# Patient Record
Sex: MALE | Race: WHITE
[De-identification: names, ages, dates, MRNs, and addresses within clinical notes are randomized per-mention and may not be internally consistent; named-entity substitution may affect disease eponyms.]

---

## 2018-03-24 ENCOUNTER — HOSPITAL ENCOUNTER (EMERGENCY)
Dept: HOSPITAL 45 - C.EDB | Age: 73
Discharge: HOME | End: 2018-03-24
Payer: COMMERCIAL

## 2018-03-24 VITALS
WEIGHT: 238.1 LBS | HEIGHT: 70.98 IN | HEIGHT: 70.98 IN | WEIGHT: 238.1 LBS | BODY MASS INDEX: 33.33 KG/M2 | BODY MASS INDEX: 33.33 KG/M2

## 2018-03-24 VITALS
HEART RATE: 83 BPM | OXYGEN SATURATION: 97 % | SYSTOLIC BLOOD PRESSURE: 146 MMHG | TEMPERATURE: 98.06 F | DIASTOLIC BLOOD PRESSURE: 92 MMHG

## 2018-03-24 DIAGNOSIS — I10: ICD-10-CM

## 2018-03-24 DIAGNOSIS — R30.0: Primary | ICD-10-CM

## 2018-03-24 DIAGNOSIS — Z79.84: ICD-10-CM

## 2018-03-24 DIAGNOSIS — Z83.3: ICD-10-CM

## 2018-03-24 DIAGNOSIS — Z82.49: ICD-10-CM

## 2018-03-24 DIAGNOSIS — Z79.899: ICD-10-CM

## 2018-03-24 DIAGNOSIS — M54.5: ICD-10-CM

## 2018-03-24 DIAGNOSIS — N40.0: ICD-10-CM

## 2018-03-24 LAB
BASOPHILS # BLD: 0.04 K/UL (ref 0–0.2)
BASOPHILS NFR BLD: 0.5 %
BUN SERPL-MCNC: 6 MG/DL (ref 7–18)
CALCIUM SERPL-MCNC: 8.8 MG/DL (ref 8.5–10.1)
CO2 SERPL-SCNC: 28 MMOL/L (ref 21–32)
CREAT SERPL-MCNC: 1.1 MG/DL (ref 0.6–1.4)
EOS ABS #: 0.07 K/UL (ref 0–0.5)
EOSINOPHIL NFR BLD AUTO: 177 K/UL (ref 130–400)
GLUCOSE SERPL-MCNC: 121 MG/DL (ref 70–99)
HCT VFR BLD CALC: 45.9 % (ref 42–52)
HGB BLD-MCNC: 16 G/DL (ref 14–18)
IG#: 0.01 K/UL (ref 0–0.02)
IMM GRANULOCYTES NFR BLD AUTO: 33.5 %
LYMPHOCYTES # BLD: 2.57 K/UL (ref 1.2–3.4)
MCH RBC QN AUTO: 33.1 PG (ref 25–34)
MCHC RBC AUTO-ENTMCNC: 34.9 G/DL (ref 32–36)
MCV RBC AUTO: 94.8 FL (ref 80–100)
MONO ABS #: 0.66 K/UL (ref 0.11–0.59)
MONOCYTES NFR BLD: 8.6 %
NEUT ABS #: 4.32 K/UL (ref 1.4–6.5)
NEUTROPHILS # BLD AUTO: 0.9 %
NEUTROPHILS NFR BLD AUTO: 56.4 %
PMV BLD AUTO: 10.2 FL (ref 7.4–10.4)
POTASSIUM SERPL-SCNC: 4.3 MMOL/L (ref 3.5–5.1)
RED CELL DISTRIBUTION WIDTH CV: 12.8 % (ref 11.5–14.5)
RED CELL DISTRIBUTION WIDTH SD: 44.3 FL (ref 36.4–46.3)
SODIUM SERPL-SCNC: 139 MMOL/L (ref 136–145)
WBC # BLD AUTO: 7.67 K/UL (ref 4.8–10.8)

## 2018-03-24 NOTE — EMERGENCY ROOM VISIT NOTE
History


Report prepared by Kayce:  Tad Hernandez


Under the Supervision of:  Dr. Kenyon Lawton D.O.


First contact with patient:  09:05


Chief Complaint:  URINARY SYMPTOMS


Stated Complaint:  SLOW URINE FLOW


Nursing Triage Summary:  


patient states he has BPH and takes flomax. "I feel like its not really 


working.. I feel like I am not emptying and its burning."





History of Present Illness


The patient is a 72 year old male who presents to the Emergency Room with 

complaints of urinary symptoms that began 1 days ago. The patient woke up this 

morning and had minimal urinary output with associated burning. He reports low 

back ache.He denies taking his blood pressure medication today. He states that 

he has BPH and takes Flomax. He denies abdominal pain.





   Source of History:  patient


   Onset:  1 day ago


   Position:  pelvis


   Quality:  burning


   Timing:  constant


   Associated Symptoms:  + back pain, + urinary symptoms (minimal output with 

burning), No abdominal pain





Review of Systems


See HPI for pertinent positives & negatives. A total of 10 systems reviewed and 

were otherwise negative.





Past Medical & Surgical


Medical Problems:


(1) BPH (benign prostatic hyperplasia)


(2) HTN (hypertension)








Family History





Diabetes mellitus


Hypertension





Social History


Smoking Status:  Never Smoker


Alcohol Use:  none


Drug Use:  none


Marital Status:  


Housing Status:  lives with family


Occupation Status:  employed, other





Current/Historical Medications


Scheduled


Atenolol (Atenolol), 25 MG PO QAM


Metformin Hcl (Glucophage), 500 MG PO BID


Rosuvastatin Calcium (Rosuvastatin Calcium), 5 MG PO DAILY


Tamsulosin HCl (Tamsulosin HCl), 0.4 MG PO DAILY





Allergies


Coded Allergies:  


     No Known Allergies (Unverified , 7/20/15)





Physical Exam


Vital Signs











  Date Time  Temp Pulse Resp B/P (MAP) Pulse Ox O2 Delivery O2 Flow Rate FiO2


 


3/24/18 11:56 36.7 83 18 146/92 97   


 


3/24/18 10:43  70 18 158/93 97 Room Air  


 


3/24/18 08:57 36.7 75 20 153/99 95 Room Air  











Physical Exam


CONSTITUTIONAL/VITAL SIGNS: Reviewed / noted above.


GENERAL: Non-toxic in appearance. 


INTEGUMENTARY: Warm, dry, and Pink.


HEAD: Normocephalic.


EYES: without scleral icterus or trauma.


ENT/OROPHARYNX: clear and moist.


LYMPHADENOPATHY/NECK: Is supple without lymphadenopathy or meningismus.


RESPIRATORY: Lungs clear and equal.


CARDIOVASCULAR: Regular rate and rhythm.


GI/ABDOMEN: Soft and nontender. No organomegaly or pulsatile mass. No rebound 

or guarding. Normal bowel sounds.


EXTREMITIES: Warm and well perfused.


BACK: No CVA tenderness.


NEUROLOGICAL: Intact without focal deficits. 


PSYCHIATRIC: normal affect.


MUSCULOSKELETAL: Normally developed with good muscle tone.





Medical Decision & Procedures


ER Provider


Diagnostic Interpretation:


Radiology results as stated below per my review and radiologist interpretation:





ABD/PELVIS WITHOUT FOR STONE





CT DOSE: 1959.11 mGy.cm





HISTORY:      flank pain





TECHNIQUE: Multiaxial CT images of the abdomen and pelvis were performed without


the use of intravenous and oral contrast according to the standard department


stone protocol.  A dose lowering technique was utilized adhering to the


principles of ALARA.





COMPARISON STUDY: None.





FINDINGS: Lung bases are clear. Liver spleen and pancreas are unremarkable.


Kidneys negative for calcification or hydronephrosis. Normal appendix.


Nonobstructive bowel pattern. Mild chronic sigmoid diverticulosis. Bladder is


midline. Small fat-containing inguinal hernias bilaterally. Slight thickening of


the root of the mesentery most likely age-related.





IMPRESSION:  





1. Mild chronic sigmoid diverticulosis.


2. No evidence for diverticulitis.


3. Abdomen and pelvis is otherwise negative. 


4. Small fat-containing inguinal hernias bilaterally.














The above report was generated using voice recognition software.  It may contain


grammatical, syntax or spelling errors.











Electronically signed by:  Kristian Sanchez M.D.


3/24/2018 11:14 AM





Dictated Date/Time:  3/24/2018 11:11 AM





Laboratory Results


3/24/18 09:15








Red Blood Count 4.84, Mean Corpuscular Volume 94.8, Mean Corpuscular Hemoglobin 

33.1, Mean Corpuscular Hemoglobin Concent 34.9, Mean Platelet Volume 10.2, 

Neutrophils (%) (Auto) 56.4, Lymphocytes (%) (Auto) 33.5, Monocytes (%) (Auto) 

8.6, Eosinophils (%) (Auto) 0.9, Basophils (%) (Auto) 0.5, Neutrophils # (Auto) 

4.32, Lymphocytes # (Auto) 2.57, Monocytes # (Auto) 0.66, Eosinophils # (Auto) 

0.07, Basophils # (Auto) 0.04





3/24/18 09:15

















Test


  3/24/18


09:15 3/24/18


09:20


 


White Blood Count


  7.67 K/uL


(4.8-10.8) 


 


 


Red Blood Count


  4.84 M/uL


(4.7-6.1) 


 


 


Hemoglobin


  16.0 g/dL


(14.0-18.0) 


 


 


Hematocrit 45.9 % (42-52)  


 


Mean Corpuscular Volume


  94.8 fL


() 


 


 


Mean Corpuscular Hemoglobin


  33.1 pg


(25-34) 


 


 


Mean Corpuscular Hemoglobin


Concent 34.9 g/dl


(32-36) 


 


 


Platelet Count


  177 K/uL


(130-400) 


 


 


Mean Platelet Volume


  10.2 fL


(7.4-10.4) 


 


 


Neutrophils (%) (Auto) 56.4 %  


 


Lymphocytes (%) (Auto) 33.5 %  


 


Monocytes (%) (Auto) 8.6 %  


 


Eosinophils (%) (Auto) 0.9 %  


 


Basophils (%) (Auto) 0.5 %  


 


Neutrophils # (Auto)


  4.32 K/uL


(1.4-6.5) 


 


 


Lymphocytes # (Auto)


  2.57 K/uL


(1.2-3.4) 


 


 


Monocytes # (Auto)


  0.66 K/uL


(0.11-0.59) 


 


 


Eosinophils # (Auto)


  0.07 K/uL


(0-0.5) 


 


 


Basophils # (Auto)


  0.04 K/uL


(0-0.2) 


 


 


RDW Standard Deviation


  44.3 fL


(36.4-46.3) 


 


 


RDW Coefficient of Variation


  12.8 %


(11.5-14.5) 


 


 


Immature Granulocyte % (Auto) 0.1 %  


 


Immature Granulocyte # (Auto)


  0.01 K/uL


(0.00-0.02) 


 


 


Anion Gap


  6.0 mmol/L


(3-11) 


 


 


Est Creatinine Clear Calc


Drug Dose 74.7 ml/min 


  


 


 


Estimated GFR (


American) 76.8 


  


 


 


Estimated GFR (Non-


American 66.2 


  


 


 


BUN/Creatinine Ratio 5.7 (10-20)  


 


Calcium Level


  8.8 mg/dl


(8.5-10.1) 


 


 


Urine Color  YELLOW 


 


Urine Appearance  CLOUDY (CLEAR) 


 


Urine pH  6.5 (4.5-7.5) 


 


Urine Specific Gravity


  


  1.012


(1.000-1.030)


 


Urine Protein  NEG (NEG) 


 


Urine Glucose (UA)  NEG (NEG) 


 


Urine Ketones  NEG (NEG) 


 


Urine Occult Blood  NEG (NEG) 


 


Urine Nitrite  NEG (NEG) 


 


Urine Bilirubin  NEG (NEG) 


 


Urine Urobilinogen  NEG (NEG) 


 


Urine Leukocyte Esterase  NEG (NEG) 


 


Urine WBC (Auto)  0 /hpf (0-5) 


 


Urine RBC (Auto)  0-4 /hpf (0-4) 


 


Urine Hyaline Casts (Auto)  0 /lpf (0-5) 


 


Urine Epithelial Cells (Auto)  0-5 /lpf (0-5) 


 


Urine Bacteria (Auto)  NEG (NEG) 





Laboratory results as stated above per my review.





ED Course


0905: Previous medical records were reviewed. The patient was evaluated in room 

B12B. A complete history and physical examination was performed.





1038: I checked on the patient and he is agreeable to a CT scan.





1134: On reevaluation, the patient is doing well. I discussed the results and 

findings with the patient. He will follow up with urology. He verbalized 

agreement of the treatment plan. He was discharged home.





Medical Decision


Differential considered: pancreatitis, hepatitis, or acute cholecystitis, AAA,  

UTI, pyelonephritis, kidney stones, appendicitis, diverticulitis, shingles, 

bowel obstruction mesenteric ischemia, intussusception,hernia, testicular 

torsion.





This is a 72-year-old male who presents to the ED with a chief complaint of 

urinary symptoms.  The patient states that he feels as though his bladder is 

not emptying completely.  He states that he felt that his Flomax was not 

working.  He also described a little burning at the end of urination yesterday.

  He has been urinating more frequently than usual.  He states that he feels 

there is decreased flow.  The patient's exam was unremarkable.  His CBC and PRP 

are normal, urine did not show infection, bladder scan revealed 0 cc of urine 

in the bladder and a CT scan of the abdomen pelvis was negative for acute 

process.  The patient was told the results.  He was felt to be stable for 

discharge.  Urology follow-up was advised if symptoms persist.





Medication Reconcilliation


Current Medication List:  was personally reviewed by me





Blood Pressure Screening


Patient's blood pressure:  Elevated blood pressure


Blood pressure disposition:  Elevated BP felt to be situational





Impression





 Primary Impression:  


 Symptoms involving urinary system





Scribe Attestation


The scribe's documentation has been prepared under my direction and personally 

reviewed by me in its entirety. I confirm that the note above accurately 

reflects all work, treatment, procedures, and medical decision making performed 

by me.





Departure Information


Dispostion


Home / Self-Care





Referrals


Cornelius Monge III, M.D. (PCP)





Patient Instructions


My [facility]





Additional Instructions





Follow-up with your doctor for further care and evaluation in 1-2 days.  Return 

to the emergency department for worsening or new symptoms or any concerns.


You have been examined and treated today on an emergency basis only. This is 

not a substitute for, or an effort to provide, complete comprehensive medical 

care. It is impossible to recognize and treat all injuries or illnesses in a 

single emergency department visit. It is therefore important that you follow up 

closely with your doctor.  Call as soon as possible for an appointment.

## 2018-03-24 NOTE — DIAGNOSTIC IMAGING REPORT
ABD/PELVIS WITHOUT FOR STONE



CT DOSE: 1959.11 mGy.cm



HISTORY:      flank pain



TECHNIQUE: Multiaxial CT images of the abdomen and pelvis were performed without

the use of intravenous and oral contrast according to the standard department

stone protocol.  A dose lowering technique was utilized adhering to the

principles of ALARA.



COMPARISON STUDY: None.



FINDINGS: Lung bases are clear. Liver spleen and pancreas are unremarkable.

Kidneys negative for calcification or hydronephrosis. Normal appendix.

Nonobstructive bowel pattern. Mild chronic sigmoid diverticulosis. Bladder is

midline. Small fat-containing inguinal hernias bilaterally. Slight thickening of

the root of the mesentery most likely age-related.



IMPRESSION:  



1. Mild chronic sigmoid diverticulosis.

2. No evidence for diverticulitis.

3. Abdomen and pelvis is otherwise negative. 

4. Small fat-containing inguinal hernias bilaterally.









The above report was generated using voice recognition software.  It may contain

grammatical, syntax or spelling errors.







Electronically signed by:  Kristian Sanchez M.D.

3/24/2018 11:14 AM



Dictated Date/Time:  3/24/2018 11:11 AM